# Patient Record
Sex: FEMALE | Race: WHITE | URBAN - METROPOLITAN AREA
[De-identification: names, ages, dates, MRNs, and addresses within clinical notes are randomized per-mention and may not be internally consistent; named-entity substitution may affect disease eponyms.]

---

## 2024-09-26 ENCOUNTER — OFFICE VISIT (OUTPATIENT)
Dept: URBAN - METROPOLITAN AREA CLINIC 96 | Facility: CLINIC | Age: 44
End: 2024-09-26
Payer: COMMERCIAL

## 2024-09-26 ENCOUNTER — DASHBOARD ENCOUNTERS (OUTPATIENT)
Age: 44
End: 2024-09-26

## 2024-09-26 VITALS
HEIGHT: 68 IN | SYSTOLIC BLOOD PRESSURE: 132 MMHG | TEMPERATURE: 97.9 F | HEART RATE: 67 BPM | DIASTOLIC BLOOD PRESSURE: 77 MMHG | BODY MASS INDEX: 19.1 KG/M2 | WEIGHT: 126 LBS

## 2024-09-26 DIAGNOSIS — K64.9 HEMORRHOIDS, UNSPECIFIED HEMORRHOID TYPE: ICD-10-CM

## 2024-09-26 DIAGNOSIS — R19.7 DIARRHEA, UNSPECIFIED TYPE: ICD-10-CM

## 2024-09-26 DIAGNOSIS — R10.12 LUQ PAIN: ICD-10-CM

## 2024-09-26 DIAGNOSIS — R93.89 ABNORMAL MRI: ICD-10-CM

## 2024-09-26 PROBLEM — 169083003: Status: ACTIVE | Noted: 2024-09-26

## 2024-09-26 PROCEDURE — 99244 OFF/OP CNSLTJ NEW/EST MOD 40: CPT | Performed by: INTERNAL MEDICINE

## 2024-09-26 RX ORDER — HYOSCYAMINE SULFATE 0.12 MG/1
PLACE 1 TABLET UNDER TONGUE BY TRANSLINGUAL ROUTE 3 TIMES A DAY AS NEEDED TABLET, ORALLY DISINTEGRATING ORAL
Qty: 90 | Refills: 0 | Status: ACTIVE | COMMUNITY
Start: 2019-09-18 | End: 1900-01-01

## 2024-09-26 NOTE — HPI-TODAY'S VISIT:
referred by Dr Awais Rios for weird bowel movements copy of the note to sent to referring MD has stephanie BMs, diarrhea always has LUQ pain had a colonoscopy 5 years ago for mucus in stool/loose stool/LUQ pain colonoscopy was NORMAL but was told "narrowing of her colon"; bx were normal but things worse once a week has very bad diarrhea takes immodium and is good for a few days but then it comes back LUQ pain never had an EGD occ brb on tt, does have hemorrhoisd on semaglutide for a year, lost 20 lbs, on q2 weeks injection